# Patient Record
Sex: FEMALE | URBAN - METROPOLITAN AREA
[De-identification: names, ages, dates, MRNs, and addresses within clinical notes are randomized per-mention and may not be internally consistent; named-entity substitution may affect disease eponyms.]

---

## 2019-02-07 ENCOUNTER — OFFICE VISIT (OUTPATIENT)
Dept: URBAN - METROPOLITAN AREA CLINIC 88 | Facility: CLINIC | Age: 54
End: 2019-02-07
Payer: COMMERCIAL

## 2019-02-07 DIAGNOSIS — H25.13 AGE-RELATED NUCLEAR CATARACT, BILATERAL: ICD-10-CM

## 2019-02-07 PROCEDURE — 99203 OFFICE O/P NEW LOW 30 MIN: CPT | Performed by: OPHTHALMOLOGY

## 2019-02-07 ASSESSMENT — INTRAOCULAR PRESSURE
OD: 16
OS: 17

## 2019-02-07 ASSESSMENT — KERATOMETRY
OD: 46.13
OS: 45.88

## 2019-02-07 NOTE — IMPRESSION/PLAN
Impression: Type 2 diabetes mellitus without complications: Z85.1. Condition: stable. Symptoms: will continue to monitor. Plan: Diabetes type II: no background retinopathy, no signs of neovascularization noted. Discussed ocular and systemic benefits of blood sugar control.

## 2019-02-07 NOTE — IMPRESSION/PLAN
Impression: Age-related nuclear cataract, bilateral: H25.13. OU. Condition: stable. Symptoms: will continue to monitor. Plan: Discussed diagnosis in detail with patient. No treatment is required at this time. The patient will monitor vision changes and contact us with any decrease in vision.

## 2020-02-28 ENCOUNTER — OFFICE VISIT (OUTPATIENT)
Dept: URBAN - METROPOLITAN AREA CLINIC 88 | Facility: CLINIC | Age: 55
End: 2020-02-28
Payer: COMMERCIAL

## 2020-02-28 DIAGNOSIS — H52.223 REGULAR ASTIGMATISM, BILATERAL: ICD-10-CM

## 2020-02-28 DIAGNOSIS — H25.813 COMBINED FORMS OF AGE-RELATED CATARACT, BILATERAL: ICD-10-CM

## 2020-02-28 DIAGNOSIS — E11.9 TYPE 2 DIABETES MELLITUS W/O COMPLICATION: Primary | ICD-10-CM

## 2020-02-28 PROCEDURE — 99204 OFFICE O/P NEW MOD 45 MIN: CPT | Performed by: OPTOMETRIST

## 2020-02-28 ASSESSMENT — VISUAL ACUITY
OS: 20/25
OD: 20/20

## 2020-02-28 ASSESSMENT — INTRAOCULAR PRESSURE
OS: 18
OD: 17

## 2020-02-28 ASSESSMENT — KERATOMETRY
OD: 45.88
OS: 45.63

## 2020-02-28 NOTE — IMPRESSION/PLAN
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 Plan: Small CHRPE OS. Ed pt regarding condition. No need for treatment. Continue to monitor on regular intervals.

## 2020-02-28 NOTE — IMPRESSION/PLAN
Impression: Type 2 diabetes mellitus w/o complication: H50.8. Plan: Findings were discussed in detail. The patient understands that there are no diabetic related changes taking place in their eyes. Patient understands the importance of monitoring blood glucose and blood pressure levels with their primary care physician to reduce the risk of diabetic related vision loss. Diet and exercise are recommended.

## 2021-03-17 ENCOUNTER — OFFICE VISIT (OUTPATIENT)
Dept: URBAN - METROPOLITAN AREA CLINIC 88 | Facility: CLINIC | Age: 56
End: 2021-03-17
Payer: COMMERCIAL

## 2021-03-17 DIAGNOSIS — H43.811 VITREOUS DEGENERATION, RIGHT EYE: Chronic | ICD-10-CM

## 2021-03-17 DIAGNOSIS — H04.123 DRY EYE SYNDROME OF BILATERAL LACRIMAL GLANDS: Chronic | ICD-10-CM

## 2021-03-17 DIAGNOSIS — H11.153 PINGUECULA, BILATERAL: Chronic | ICD-10-CM

## 2021-03-17 DIAGNOSIS — Q14.1 CONGENITAL MALFORMATION OF RETINA: Chronic | ICD-10-CM

## 2021-03-17 PROCEDURE — 92014 COMPRE OPH EXAM EST PT 1/>: CPT | Performed by: OPTOMETRIST

## 2021-03-17 ASSESSMENT — INTRAOCULAR PRESSURE
OD: 16
OS: 15

## 2021-03-17 ASSESSMENT — VISUAL ACUITY
OD: 20/20
OS: 20/20

## 2021-03-17 NOTE — IMPRESSION/PLAN
Impression: Regular astigmatism, bilateral: H52.223. Plan: Refraction performed demonstrating improved vision with glasses. Pt declined rx today. Recently updated glasses.

## 2021-03-17 NOTE — IMPRESSION/PLAN
Impression: Dry eye syndrome of bilateral lacrimal glands: H04.123. Plan: Discussed chronic nature of condition in detail with patient. Explained condition does not have a cure and will need artificial tears for maintenance. Recommended artificial tears QID OU (Systane complete sample given today) for continuous maintenance of tear film. RTC if symptoms worsen or do not improve.

## 2021-03-17 NOTE — IMPRESSION/PLAN
Impression: Type 2 diabetes mellitus w/o complication: P41.3. Plan: Findings were discussed in detail. The patient understands that there are no diabetic related changes taking place in their eyes. Patient understands the importance of monitoring blood glucose and blood pressure levels with their primary care physician to reduce the risk of diabetic related vision loss. Diet and exercise are recommended.

## 2022-05-11 ENCOUNTER — OFFICE VISIT (OUTPATIENT)
Dept: URBAN - METROPOLITAN AREA CLINIC 88 | Facility: CLINIC | Age: 57
End: 2022-05-11
Payer: COMMERCIAL

## 2022-05-11 DIAGNOSIS — E11.9 TYPE 2 DIABETES MELLITUS W/O COMPLICATION: Primary | ICD-10-CM

## 2022-05-11 DIAGNOSIS — H43.813 VITREOUS DEGENERATION, BILATERAL: ICD-10-CM

## 2022-05-11 DIAGNOSIS — Q14.1 CONGENITAL MALFORMATION OF RETINA: ICD-10-CM

## 2022-05-11 DIAGNOSIS — H04.123 DRY EYE SYNDROME OF BILATERAL LACRIMAL GLANDS: ICD-10-CM

## 2022-05-11 DIAGNOSIS — H11.153 PINGUECULA, BILATERAL: ICD-10-CM

## 2022-05-11 PROCEDURE — 99214 OFFICE O/P EST MOD 30 MIN: CPT | Performed by: OPTOMETRIST

## 2022-05-11 PROCEDURE — 92134 CPTRZ OPH DX IMG PST SGM RTA: CPT | Performed by: OPTOMETRIST

## 2022-05-11 ASSESSMENT — INTRAOCULAR PRESSURE
OS: 16
OD: 16

## 2022-05-11 ASSESSMENT — VISUAL ACUITY
OS: 20/20
OD: 20/30

## 2022-05-11 ASSESSMENT — KERATOMETRY
OS: 46.13
OD: 46.50

## 2022-05-11 NOTE — IMPRESSION/PLAN
Impression: Type 2 diabetes mellitus w/o complication: D49.9. Plan: Findings were discussed in detail. The patient understands that there are no diabetic related changes taking place in their eyes. Patient understands the importance of monitoring blood glucose and blood pressure levels with their primary care physician to reduce the risk of diabetic related vision loss. Diet and exercise are recommended.

## 2022-05-11 NOTE — IMPRESSION/PLAN
Impression: Combined forms of age-related cataract, bilateral: H25.813. Plan: Patient educated cataracts account for decreased visual acuity. OCT macula today, flat OU. Patient experiencing symptoms of visual decrease that is effecting quality of life. Reviewed potential R/B/A/Q's of cataract extraction procedure and recovery. Patient understands changing glasses will have limited improvement to vision. Patient defers glasses Rx at this time. Patient desires to have surgery, recommend phacoemulsification with intraocular lens. A brief introduction to various IOL options was discussed today. Patient to meet with surgical counselor to review surgical and lens options. Visual function form to be completed by surgical counselor. Associated risk level, surgical orders, and final lens selection at discretion of operating surgeon. Proceed with cataract extraction, recommend OD only.

## 2022-05-26 ENCOUNTER — TESTING ONLY (OUTPATIENT)
Dept: URBAN - METROPOLITAN AREA CLINIC 88 | Facility: CLINIC | Age: 57
End: 2022-05-26
Payer: COMMERCIAL

## 2022-05-26 DIAGNOSIS — H25.813 COMBINED FORMS OF AGE-RELATED CATARACT, BILATERAL: Primary | ICD-10-CM

## 2022-06-28 ENCOUNTER — SURGERY (OUTPATIENT)
Dept: URBAN - METROPOLITAN AREA SURGERY 56 | Facility: SURGERY | Age: 57
End: 2022-06-28
Payer: COMMERCIAL

## 2022-06-28 PROCEDURE — 66984 XCAPSL CTRC RMVL W/O ECP: CPT | Performed by: OPHTHALMOLOGY

## 2022-06-29 ENCOUNTER — POST-OPERATIVE VISIT (OUTPATIENT)
Dept: URBAN - METROPOLITAN AREA CLINIC 89 | Facility: CLINIC | Age: 57
End: 2022-06-29
Payer: COMMERCIAL

## 2022-06-29 DIAGNOSIS — Z48.810 ENCOUNTER FOR SURGICAL AFTERCARE FOLLOWING SURGERY ON A SENSE ORGAN: Primary | ICD-10-CM

## 2022-06-29 PROCEDURE — 99024 POSTOP FOLLOW-UP VISIT: CPT | Performed by: OPTOMETRIST

## 2022-06-29 ASSESSMENT — INTRAOCULAR PRESSURE: OD: 25

## 2022-07-07 ENCOUNTER — POST-OPERATIVE VISIT (OUTPATIENT)
Dept: URBAN - METROPOLITAN AREA CLINIC 88 | Facility: CLINIC | Age: 57
End: 2022-07-07
Payer: COMMERCIAL

## 2022-07-07 DIAGNOSIS — Z48.810 ENCOUNTER FOR SURGICAL AFTERCARE FOLLOWING SURGERY ON A SENSE ORGAN: Primary | ICD-10-CM

## 2022-07-07 PROCEDURE — 99024 POSTOP FOLLOW-UP VISIT: CPT | Performed by: OPTOMETRIST

## 2022-07-07 ASSESSMENT — INTRAOCULAR PRESSURE
OS: 13
OD: 13

## 2022-07-07 ASSESSMENT — VISUAL ACUITY
OD: 20/40
OS: 20/30

## 2022-07-07 NOTE — IMPRESSION/PLAN
Impression: S/P Cataract Extraction by phacoemulsification with IOL placement OD - 9 Days. Encounter for surgical aftercare following surgery on a sense organ  Z48.810. Post operative instructions reviewed - Plan: Patient educated to post operative status. Patient knows to not rub their eye. Patient advised to call immediately for any problems including, but not limited to: pain, redness, increase in floaters, flashing lights, changes in peripheral vision, decreased vision, and/or any other problems or concerns. Patient stated an understanding of all the instructions. Follow-up in approximately three weeks / prn for additional monitoring. --Advised patient to use artificial tears for comfort.

## 2022-07-27 ENCOUNTER — POST-OPERATIVE VISIT (OUTPATIENT)
Dept: URBAN - METROPOLITAN AREA CLINIC 88 | Facility: CLINIC | Age: 57
End: 2022-07-27
Payer: COMMERCIAL

## 2022-07-27 DIAGNOSIS — Z48.810 ENCOUNTER FOR SURGICAL AFTERCARE FOLLOWING SURGERY ON A SENSE ORGAN: Primary | ICD-10-CM

## 2022-07-27 PROCEDURE — 99024 POSTOP FOLLOW-UP VISIT: CPT | Performed by: OPTOMETRIST

## 2022-07-27 RX ORDER — PREDNISOLONE ACETATE 10 MG/ML
1 % SUSPENSION/ DROPS OPHTHALMIC
Qty: 10 | Refills: 0 | Status: ACTIVE
Start: 2022-07-27

## 2022-07-27 ASSESSMENT — INTRAOCULAR PRESSURE
OD: 15
OS: 15

## 2022-07-27 ASSESSMENT — VISUAL ACUITY
OD: 20/40
OS: 20/40

## 2022-07-27 NOTE — IMPRESSION/PLAN
Impression: S/P Cataract Extraction by phacoemulsification with IOL placement OD - 29 Days. Encounter for surgical aftercare following surgery on a sense organ  Z48.810. Plan: Pt had Dexycu and Moxifloxacin during surgery, no surgical drops needed at this time. RTC immediately if increase in pain, redness or decrease in vision occurs. Rx for Prednisolone OD QID X TID X 1 week, BID X 1 week, QD X 1 week, then D/C.

## 2022-08-17 ENCOUNTER — POST-OPERATIVE VISIT (OUTPATIENT)
Dept: URBAN - METROPOLITAN AREA CLINIC 88 | Facility: CLINIC | Age: 57
End: 2022-08-17
Payer: COMMERCIAL

## 2022-08-17 DIAGNOSIS — Z48.810 ENCOUNTER FOR SURGICAL AFTERCARE FOLLOWING SURGERY ON A SENSE ORGAN: Primary | ICD-10-CM

## 2022-08-17 PROCEDURE — 99024 POSTOP FOLLOW-UP VISIT: CPT | Performed by: OPTOMETRIST

## 2022-08-17 ASSESSMENT — INTRAOCULAR PRESSURE
OD: 14
OS: 15

## 2022-08-17 NOTE — IMPRESSION/PLAN
Impression: S/P Cataract Extraction by phacoemulsification with IOL placement OD - 50 Days. Encounter for surgical aftercare following surgery on a sense organ  Z48.810. Plan: OD healing well, finish Prednisolone drops.  RTC in 6-8 weeks for diabetic eye exam.

## 2022-10-06 ENCOUNTER — OFFICE VISIT (OUTPATIENT)
Dept: URBAN - METROPOLITAN AREA CLINIC 88 | Facility: CLINIC | Age: 57
End: 2022-10-06
Payer: COMMERCIAL

## 2022-10-06 DIAGNOSIS — E11.9 TYPE 2 DIABETES MELLITUS W/O COMPLICATION: Primary | ICD-10-CM

## 2022-10-06 DIAGNOSIS — H04.123 DRY EYE SYNDROME OF BILATERAL LACRIMAL GLANDS: ICD-10-CM

## 2022-10-06 DIAGNOSIS — H43.393 OTHER VITREOUS OPACITIES, BILATERAL: ICD-10-CM

## 2022-10-06 DIAGNOSIS — H25.812 COMBINED FORMS OF AGE-RELATED CATARACT, LEFT EYE: ICD-10-CM

## 2022-10-06 DIAGNOSIS — Z96.1 PRESENCE OF PSEUDOPHAKIA: ICD-10-CM

## 2022-10-06 PROCEDURE — 99213 OFFICE O/P EST LOW 20 MIN: CPT | Performed by: OPTOMETRIST

## 2022-10-06 ASSESSMENT — KERATOMETRY
OS: 46.00
OD: 46.50

## 2022-10-06 ASSESSMENT — INTRAOCULAR PRESSURE
OD: 8
OS: 10

## 2022-10-06 NOTE — IMPRESSION/PLAN
Impression: Type 2 diabetes mellitus w/o complication: N09.6. Plan: Findings were discussed in detail. The patient understands that there are no diabetic related changes taking place in their eyes. Patient understands the importance of monitoring blood glucose and blood pressure levels with their primary care physician to reduce the risk of diabetic related vision loss. Diet and exercise are recommended.

## 2023-11-10 ENCOUNTER — OFFICE VISIT (OUTPATIENT)
Dept: URBAN - METROPOLITAN AREA CLINIC 88 | Facility: CLINIC | Age: 58
End: 2023-11-10
Payer: COMMERCIAL

## 2023-11-10 DIAGNOSIS — E11.9 TYPE 2 DIABETES MELLITUS W/O COMPLICATION: Primary | ICD-10-CM

## 2023-11-10 DIAGNOSIS — Z96.1 PRESENCE OF PSEUDOPHAKIA: ICD-10-CM

## 2023-11-10 DIAGNOSIS — H43.813 VITREOUS DEGENERATION, BILATERAL: ICD-10-CM

## 2023-11-10 DIAGNOSIS — H25.812 COMBINED FORMS OF AGE-RELATED CATARACT, LEFT EYE: ICD-10-CM

## 2023-11-10 DIAGNOSIS — H04.123 DRY EYE SYNDROME OF BILATERAL LACRIMAL GLANDS: ICD-10-CM

## 2023-11-10 PROCEDURE — 99213 OFFICE O/P EST LOW 20 MIN: CPT | Performed by: OPTOMETRIST

## 2023-11-10 ASSESSMENT — INTRAOCULAR PRESSURE
OS: 14
OD: 14

## 2024-11-13 ENCOUNTER — OFFICE VISIT (OUTPATIENT)
Dept: URBAN - METROPOLITAN AREA CLINIC 88 | Facility: CLINIC | Age: 59
End: 2024-11-13
Payer: COMMERCIAL

## 2024-11-13 DIAGNOSIS — H10.413 CONJUNCTIVITIS - ALLERGIC: ICD-10-CM

## 2024-11-13 DIAGNOSIS — Z96.1 PRESENCE OF PSEUDOPHAKIA: ICD-10-CM

## 2024-11-13 DIAGNOSIS — E11.9 TYPE 2 DIABETES MELLITUS W/O COMPLICATION: Primary | ICD-10-CM

## 2024-11-13 DIAGNOSIS — H25.812 COMBINED FORMS OF AGE-RELATED CATARACT, LEFT EYE: ICD-10-CM

## 2024-11-13 DIAGNOSIS — H04.123 DRY EYE SYNDROME OF BILATERAL LACRIMAL GLANDS: ICD-10-CM

## 2024-11-13 DIAGNOSIS — H43.813 VITREOUS DEGENERATION, BILATERAL: ICD-10-CM

## 2024-11-13 PROCEDURE — 92014 COMPRE OPH EXAM EST PT 1/>: CPT | Performed by: OPTOMETRIST

## 2024-11-13 PROCEDURE — 92134 CPTRZ OPH DX IMG PST SGM RTA: CPT | Performed by: OPTOMETRIST

## 2024-11-13 ASSESSMENT — INTRAOCULAR PRESSURE
OS: 15
OD: 14

## 2024-11-13 ASSESSMENT — VISUAL ACUITY
OS: 20/30
OD: 20/20